# Patient Record
Sex: MALE | Race: WHITE | URBAN - METROPOLITAN AREA
[De-identification: names, ages, dates, MRNs, and addresses within clinical notes are randomized per-mention and may not be internally consistent; named-entity substitution may affect disease eponyms.]

---

## 2022-09-07 ENCOUNTER — OFFICE VISIT (OUTPATIENT)
Dept: URBAN - METROPOLITAN AREA CLINIC 5 | Facility: CLINIC | Age: 67
End: 2022-09-07
Payer: COMMERCIAL

## 2022-09-07 DIAGNOSIS — H52.4 PRESBYOPIA: ICD-10-CM

## 2022-09-07 DIAGNOSIS — H25.813 COMBINED FORMS OF AGE-RELATED CATARACT, BILATERAL: ICD-10-CM

## 2022-09-07 DIAGNOSIS — H35.3131 NONEXUDATIVE MACULAR DEGENERATION, EARLY DRY STAGE, BILATERAL: Primary | ICD-10-CM

## 2022-09-07 PROCEDURE — 92004 COMPRE OPH EXAM NEW PT 1/>: CPT | Performed by: OPTOMETRIST

## 2022-09-07 PROCEDURE — 92134 CPTRZ OPH DX IMG PST SGM RTA: CPT | Performed by: OPTOMETRIST

## 2022-09-07 ASSESSMENT — INTRAOCULAR PRESSURE
OS: 14
OD: 15

## 2022-09-07 ASSESSMENT — VISUAL ACUITY
OD: 20/70
OS: 20/25

## 2022-09-07 NOTE — IMPRESSION/PLAN
Impression: Nonexudative macular degeneration, early dry stage, bilateral: H35.4025. Plan: Dry Macular Degeneration with an absence of macular thickening or heme from choroidal neovascularization - Pt does appear to have drusen under the macula OU imaged with MOCT today. Discussed disease process with patient. Recommended AREDS2 vitamins with anti-oxidants and [place] grid monitoring daily. Discussed avoiding cigarette smoke, including second-hand smoke. Return immediately for any worsening symptoms or change in vision. Plan to repeat MOCT again in 1 year / sooner PRN.

## 2022-09-07 NOTE — IMPRESSION/PLAN
Impression: Presbyopia: H52.4. Plan: Rx given for spectacle Rx. Polycarb for protection of better seeing eye.

## 2023-09-08 ENCOUNTER — OFFICE VISIT (OUTPATIENT)
Dept: URBAN - METROPOLITAN AREA CLINIC 5 | Facility: CLINIC | Age: 68
End: 2023-09-08
Payer: COMMERCIAL

## 2023-09-08 DIAGNOSIS — H53.001 UNSPECIFIED AMBLYOPIA, RIGHT EYE: ICD-10-CM

## 2023-09-08 DIAGNOSIS — H25.813 COMBINED FORMS OF AGE-RELATED CATARACT, BILATERAL: ICD-10-CM

## 2023-09-08 DIAGNOSIS — H35.3131 NONEXUDATIVE MACULAR DEGENERATION, EARLY DRY STAGE, BILATERAL: Primary | ICD-10-CM

## 2023-09-08 PROCEDURE — 92014 COMPRE OPH EXAM EST PT 1/>: CPT | Performed by: OPTOMETRIST

## 2023-09-08 PROCEDURE — 92134 CPTRZ OPH DX IMG PST SGM RTA: CPT | Performed by: OPTOMETRIST

## 2023-09-08 ASSESSMENT — INTRAOCULAR PRESSURE
OD: 18
OS: 19